# Patient Record
Sex: MALE | Race: WHITE | NOT HISPANIC OR LATINO | Employment: FULL TIME | URBAN - METROPOLITAN AREA
[De-identification: names, ages, dates, MRNs, and addresses within clinical notes are randomized per-mention and may not be internally consistent; named-entity substitution may affect disease eponyms.]

---

## 2018-01-01 ENCOUNTER — OFFICE VISIT (OUTPATIENT)
Dept: HEPATOLOGY | Facility: CLINIC | Age: 58
End: 2018-01-01
Payer: COMMERCIAL

## 2018-01-01 ENCOUNTER — LAB VISIT (OUTPATIENT)
Dept: LAB | Facility: HOSPITAL | Age: 58
End: 2018-01-01
Attending: INTERNAL MEDICINE
Payer: COMMERCIAL

## 2018-01-01 VITALS
HEART RATE: 99 BPM | DIASTOLIC BLOOD PRESSURE: 79 MMHG | OXYGEN SATURATION: 93 % | BODY MASS INDEX: 28.53 KG/M2 | SYSTOLIC BLOOD PRESSURE: 169 MMHG | TEMPERATURE: 98 F | WEIGHT: 199.31 LBS | HEIGHT: 70 IN

## 2018-01-01 DIAGNOSIS — B19.20 DECOMPENSATED HCV CIRRHOSIS: ICD-10-CM

## 2018-01-01 DIAGNOSIS — K74.69 DECOMPENSATED CIRRHOSIS RELATED TO HEPATITIS C VIRUS (HCV): ICD-10-CM

## 2018-01-01 DIAGNOSIS — B19.20 DECOMPENSATED HCV CIRRHOSIS: Primary | ICD-10-CM

## 2018-01-01 DIAGNOSIS — D59.10 AUTOIMMUNE HEMOLYTIC ANEMIA: ICD-10-CM

## 2018-01-01 DIAGNOSIS — K74.69 DECOMPENSATED HCV CIRRHOSIS: Primary | ICD-10-CM

## 2018-01-01 DIAGNOSIS — D49.0 LIVER NEOPLASM: ICD-10-CM

## 2018-01-01 DIAGNOSIS — R18.8 OTHER ASCITES: Primary | ICD-10-CM

## 2018-01-01 DIAGNOSIS — R18.8 CIRRHOSIS OF LIVER WITH ASCITES, UNSPECIFIED HEPATIC CIRRHOSIS TYPE: ICD-10-CM

## 2018-01-01 DIAGNOSIS — K76.6 HYPERTENSION, PORTAL: ICD-10-CM

## 2018-01-01 DIAGNOSIS — B19.20 DECOMPENSATED CIRRHOSIS RELATED TO HEPATITIS C VIRUS (HCV): ICD-10-CM

## 2018-01-01 DIAGNOSIS — K74.69 DECOMPENSATED HCV CIRRHOSIS: ICD-10-CM

## 2018-01-01 DIAGNOSIS — C34.02 MALIGNANT NEOPLASM OF HILUS OF LEFT LUNG: ICD-10-CM

## 2018-01-01 DIAGNOSIS — K74.60 CIRRHOSIS OF LIVER WITH ASCITES, UNSPECIFIED HEPATIC CIRRHOSIS TYPE: ICD-10-CM

## 2018-01-01 LAB
ALBUMIN SERPL BCP-MCNC: 3.1 G/DL
ALP SERPL-CCNC: 106 U/L
ALT SERPL W/O P-5'-P-CCNC: 24 U/L
ANION GAP SERPL CALC-SCNC: 14 MMOL/L
ANISOCYTOSIS BLD QL SMEAR: ABNORMAL
AST SERPL-CCNC: 23 U/L
BASOPHILS # BLD AUTO: 0.04 K/UL
BASOPHILS NFR BLD: 0.2 %
BILIRUB DIRECT SERPL-MCNC: 2.2 MG/DL
BILIRUB SERPL-MCNC: 11.1 MG/DL
BUN SERPL-MCNC: 23 MG/DL
CALCIUM SERPL-MCNC: 8.9 MG/DL
CHLORIDE SERPL-SCNC: 103 MMOL/L
CO2 SERPL-SCNC: 21 MMOL/L
CREAT SERPL-MCNC: 0.8 MG/DL
DIFFERENTIAL METHOD: ABNORMAL
EOSINOPHIL # BLD AUTO: 0.2 K/UL
EOSINOPHIL NFR BLD: 1.1 %
ERYTHROCYTE [DISTWIDTH] IN BLOOD BY AUTOMATED COUNT: 21.4 %
EST. GFR  (AFRICAN AMERICAN): >60 ML/MIN/1.73 M^2
EST. GFR  (NON AFRICAN AMERICAN): >60 ML/MIN/1.73 M^2
GGT SERPL-CCNC: 98 U/L
GLUCOSE SERPL-MCNC: 441 MG/DL
HCT VFR BLD AUTO: 25.8 %
HCV GENTYP SERPL NAA+PROBE: ABNORMAL
HCV RNA SERPL NAA+PROBE-LOG IU: 4.68 LOG (10) IU/ML
HCV RNA SERPL QL NAA+PROBE: DETECTED
HCV RNA SPEC NAA+PROBE-ACNC: ABNORMAL IU/ML
HGB BLD-MCNC: 8.3 G/DL
IMM GRANULOCYTES # BLD AUTO: 0.27 K/UL
IMM GRANULOCYTES NFR BLD AUTO: 1.5 %
INR PPP: 1.3
LYMPHOCYTES # BLD AUTO: 1 K/UL
LYMPHOCYTES NFR BLD: 5.7 %
MCH RBC QN AUTO: 40.7 PG
MCHC RBC AUTO-ENTMCNC: 32.2 G/DL
MCV RBC AUTO: 127 FL
MONOCYTES # BLD AUTO: 1 K/UL
MONOCYTES NFR BLD: 5.9 %
NEUTROPHILS # BLD AUTO: 14.9 K/UL
NEUTROPHILS NFR BLD: 85.6 %
NRBC BLD-RTO: 2 /100 WBC
PLATELET # BLD AUTO: 65 K/UL
PLATELET BLD QL SMEAR: ABNORMAL
PMV BLD AUTO: 11.3 FL
POLYCHROMASIA BLD QL SMEAR: ABNORMAL
POTASSIUM SERPL-SCNC: 3.4 MMOL/L
PROT SERPL-MCNC: 5.5 G/DL
PROTHROMBIN TIME: 13 SEC
RBC # BLD AUTO: 2.04 M/UL
SODIUM SERPL-SCNC: 138 MMOL/L
WBC # BLD AUTO: 17.42 K/UL

## 2018-01-01 PROCEDURE — 99999 PR PBB SHADOW E&M-EST. PATIENT-LVL III: CPT | Mod: PBBFAC,,, | Performed by: INTERNAL MEDICINE

## 2018-01-01 PROCEDURE — 36415 COLL VENOUS BLD VENIPUNCTURE: CPT

## 2018-01-01 PROCEDURE — 87522 HEPATITIS C REVRS TRNSCRPJ: CPT

## 2018-01-01 PROCEDURE — 85610 PROTHROMBIN TIME: CPT

## 2018-01-01 PROCEDURE — 85025 COMPLETE CBC W/AUTO DIFF WBC: CPT

## 2018-01-01 PROCEDURE — 99205 OFFICE O/P NEW HI 60 MIN: CPT | Mod: S$GLB,,, | Performed by: INTERNAL MEDICINE

## 2018-01-01 PROCEDURE — 3077F SYST BP >= 140 MM HG: CPT | Mod: CPTII,S$GLB,, | Performed by: INTERNAL MEDICINE

## 2018-01-01 PROCEDURE — 3008F BODY MASS INDEX DOCD: CPT | Mod: CPTII,S$GLB,, | Performed by: INTERNAL MEDICINE

## 2018-01-01 PROCEDURE — 82977 ASSAY OF GGT: CPT

## 2018-01-01 PROCEDURE — 80053 COMPREHEN METABOLIC PANEL: CPT

## 2018-01-01 PROCEDURE — 82248 BILIRUBIN DIRECT: CPT

## 2018-01-01 PROCEDURE — 87902 NFCT AGT GNTYP ALYS HEP C: CPT

## 2018-01-01 PROCEDURE — 3078F DIAST BP <80 MM HG: CPT | Mod: CPTII,S$GLB,, | Performed by: INTERNAL MEDICINE

## 2018-01-01 RX ORDER — SPIRONOLACTONE 50 MG/1
50 TABLET, FILM COATED ORAL DAILY
Qty: 30 TABLET | Refills: 3 | Status: SHIPPED | OUTPATIENT
Start: 2018-01-01 | End: 2018-09-15

## 2018-01-01 RX ORDER — FUROSEMIDE 20 MG/1
40 TABLET ORAL DAILY
Qty: 30 TABLET | Refills: 3 | Status: SHIPPED | OUTPATIENT
Start: 2018-01-01 | End: 2019-05-18

## 2018-03-17 PROBLEM — D58.9 HEMOLYTIC ANEMIA: Status: ACTIVE | Noted: 2018-01-01

## 2018-03-17 PROBLEM — K74.60 LIVER CIRRHOSIS: Status: ACTIVE | Noted: 2018-01-01

## 2018-03-17 PROBLEM — R63.4 WEIGHT LOSS, UNINTENTIONAL: Status: ACTIVE | Noted: 2018-01-01

## 2018-03-17 PROBLEM — D69.6 THROMBOCYTOPENIA: Status: ACTIVE | Noted: 2018-01-01

## 2018-03-18 PROBLEM — D64.89 ANEMIA DUE TO MULTIPLE MECHANISMS: Status: ACTIVE | Noted: 2018-01-01

## 2018-03-18 PROBLEM — D59.10 AUTOIMMUNE HEMOLYTIC ANEMIA: Status: ACTIVE | Noted: 2018-01-01

## 2018-03-18 PROBLEM — D73.1 THROMBOCYTOPENIA DUE TO HYPERSPLENISM: Status: ACTIVE | Noted: 2018-01-01

## 2018-03-18 PROBLEM — D69.59 THROMBOCYTOPENIA DUE TO HYPERSPLENISM: Status: ACTIVE | Noted: 2018-01-01

## 2018-03-18 PROBLEM — B19.20 COMPENSATED HCV CIRRHOSIS: Status: ACTIVE | Noted: 2018-01-01

## 2018-03-18 PROBLEM — K74.69 COMPENSATED HCV CIRRHOSIS: Status: ACTIVE | Noted: 2018-01-01

## 2018-03-19 PROBLEM — K74.60 CHRONIC HEPATITIS C WITH CIRRHOSIS: Status: ACTIVE | Noted: 2018-01-01

## 2018-03-19 PROBLEM — B18.2 CHRONIC HEPATITIS C WITH CIRRHOSIS: Status: ACTIVE | Noted: 2018-01-01

## 2018-03-19 PROBLEM — R06.02 SOB (SHORTNESS OF BREATH): Status: ACTIVE | Noted: 2018-01-01

## 2018-03-19 PROBLEM — C34.02 MALIGNANT NEOPLASM OF HILUS OF LEFT LUNG: Status: ACTIVE | Noted: 2018-01-01

## 2018-04-23 PROBLEM — K21.9 GERD (GASTROESOPHAGEAL REFLUX DISEASE): Status: ACTIVE | Noted: 2018-01-01

## 2018-04-23 PROBLEM — J44.9 COPD (CHRONIC OBSTRUCTIVE PULMONARY DISEASE): Status: ACTIVE | Noted: 2018-01-01

## 2018-04-23 PROBLEM — I10 HYPERTENSION: Status: ACTIVE | Noted: 2018-01-01

## 2018-04-23 PROBLEM — F14.11 H/O COCAINE ABUSE: Status: ACTIVE | Noted: 2018-01-01

## 2018-05-18 NOTE — PROGRESS NOTES
Subjective:       Patient ID: Miguel Olguin is a 57 y.o. male.    Chief Complaint: Hepatitis C and Cirrhosis    HPI  I saw this 57 y.o. man who came to clinic with a friend. He is currently being treated for lung cancer but discovered about 1 month ago (April 2018) that he had decompensated HCV cirrhosis.    Treatment naive.    Lung Ca- 1 year ago- radiotherapy (May 2017- 8 treatments)  Now followed by Dr Zazueta at [facility] until lung Ca  On prednisone since April 23 2018 for autoimmune anemia.  Recently started on diuretics    Main complaint is ascites/leg edema and jaundice that have been troubling him for the last 2 weeks    On methadone- 19 years    No alcohol since age 32.    MELD-Na score: 18 at 5/18/2018  1:43 PM  MELD score: 18 at 5/18/2018  1:43 PM  Calculated from:  Serum Creatinine: 0.8 mg/dL (Rounded to 1) at 5/18/2018  1:43 PM  Serum Sodium: 138 mmol/L (Rounded to 137) at 5/18/2018  1:43 PM  Total Bilirubin: 11.1 mg/dL at 5/18/2018  1:43 PM  INR(ratio): 1.3 at 5/18/2018  1:43 PM  Age: 57 years      PMH:  Type DM  Lung Ca  HCV cirrhosis  Autoimmune hemolytic anemia    SH:  Lives with friends  No family around  Ex smoker    Review of Systems   Constitutional: Negative for activity change, appetite change, chills, fatigue, fever and unexpected weight change.   HENT: Negative for hearing loss.    Eyes: Negative for discharge and visual disturbance.   Respiratory: Negative for cough, chest tightness, shortness of breath and wheezing.    Cardiovascular: Negative for chest pain, palpitations and leg swelling.   Gastrointestinal: Negative for abdominal distention, abdominal pain, constipation, diarrhea and nausea.   Genitourinary: Negative for dysuria and frequency.   Musculoskeletal: Negative for arthralgias and back pain.   Skin: Negative for pallor and rash.   Neurological: Negative for dizziness, tremors, speech difficulty and headaches.   Hematological: Negative for adenopathy.  "  Psychiatric/Behavioral: Negative for agitation and confusion.           Lab Results   Component Value Date    ALT 24 05/18/2018    AST 23 05/18/2018    GGT 98 (H) 05/18/2018    ALKPHOS 106 05/18/2018    BILITOT 11.1 (H) 05/18/2018     Past Medical History:   Diagnosis Date    Anemia due to multiple mechanisms 3/18/2018    Autoimmune hemolytic anemia 3/18/2018    Calculus of kidney     Cancer     LUNG    Cocaine abuse     Compensated HCV cirrhosis 3/18/2018    COPD (chronic obstructive pulmonary disease)     Diabetes mellitus     Gallstones     GERD (gastroesophageal reflux disease)     Hepatitis C     Hypertension     Thrombocytopenia due to hypersplenism 3/18/2018     Past Surgical History:   Procedure Laterality Date    APPENDECTOMY      HERNIA REPAIR      KNEE SURGERY       Current Outpatient Prescriptions   Medication Sig    folic acid (FOLVITE) 1 MG tablet Take 1 tablet (1 mg total) by mouth once daily.    furosemide (LASIX) 20 MG tablet Take 2 tablets (40 mg total) by mouth once daily.    insulin detemir U-100 (LEVEMIR) 100 unit/mL injection Inject 15 Units into the skin 2 (two) times daily. (Patient taking differently: Inject 16 Units into the skin once daily. )    insulin lispro (HUMALOG KWIKPEN INSULIN) 100 unit/mL InPn pen Glucose Pre-meal: If 151-200 give 2 units; if 201-250 give 3 units; if 251-300 give 4 units; if 301-350 give 5 units; if >350 give 6 units    methadone (METHADOSE) 40 mg disintegrating tablet Take 240 mg by mouth once daily.    nystatin (MYCOSTATIN) 100,000 unit/mL suspension Take 5 mLs by mouth 2 (two) times daily.    pantoprazole (PROTONIX) 40 MG tablet Take 1 tablet (40 mg total) by mouth once daily.    pen needle, diabetic 31 gauge x 5/16" Ndle 1 applicator by Misc.(Non-Drug; Combo Route) route 4 (four) times daily.    predniSONE (DELTASONE) 20 MG tablet Take 20 mg by mouth 4 (four) times daily.    spironolactone (ALDACTONE) 50 MG tablet Take 1 tablet (50 " mg total) by mouth once daily.     Current Facility-Administered Medications   Medication    famotidine (PF) injection 20 mg    famotidine (PF) injection 20 mg       Objective:      Physical Exam   Constitutional: He is oriented to person, place, and time. He appears well-developed and well-nourished.   HENT:   Head: Normocephalic.   Eyes: Pupils are equal, round, and reactive to light. Scleral icterus is present.   Neck: No thyromegaly present.   Cardiovascular: Normal rate, regular rhythm and normal heart sounds.    Pulmonary/Chest: Effort normal and breath sounds normal. He has no wheezes.   Abdominal: Soft. He exhibits distension. He exhibits no mass. There is no tenderness.   Musculoskeletal: He exhibits edema.   Lymphadenopathy:     He has no cervical adenopathy.   Neurological: He is alert and oriented to person, place, and time.   Skin: Skin is warm. No rash noted. No erythema.   Psychiatric: He has a normal mood and affect. His behavior is normal.       Assessment:       1. Other ascites    2. Liver neoplasm    3. Hypertension, portal    4. Malignant neoplasm of hilus of left lung    5. Cirrhosis of liver with ascites, unspecified hepatic cirrhosis type    6. Decompensated cirrhosis related to hepatitis C virus (HCV)    7. Autoimmune hemolytic anemia        Plan:   Unfortunately he has advanced HCV cirrhosis and I do not think that antiviral treatment at this stage will help him. His lung cancer means that he I snot a liver transplant candidate.    We are therefore limited in his management which can only be supportive.    - continue diuretics but increase doses- new prescription sent  - EGD to check for varices and assess his dysphagia  - CT abdo to rule out HCC as cause of his recent decompensation  - weekly labs  Clinic in 2 weeks

## 2018-05-18 NOTE — LETTER
May 20, 2018      Vicente Rivas MD  [address] [address]           [facility] - Hepatology  [address]  [facility] [address]  Phone: [phone]  Fax: [phone]          Patient: Miguel Olguin   MR Number: [id number]   YOB: 1960   Date of Visit: 5/18/2018       Dear Dr. Vicente Rivas:    Thank you for referring Miguel Olguin to me for evaluation. Attached you will find relevant portions of my assessment and plan of care.    If you have questions, please do not hesitate to call me. I look forward to following Miguel Olguin along with you.    Sincerely,    Miguel Burns MD    Enclosure  CC:  No Recipients    If you would like to receive this communication electronically, please contact [email or web address] or [phone] to request more information on [employer] access.    For providers and/or their staff who would like to refer a patient to [facility], please contact us through our one-stop-shop provider referral line, [facility] , at [phone].    If you feel you have received this communication in error or would no longer like to receive these types of communications, please e-mail [email or web address]

## 2018-05-20 PROBLEM — K74.60 LIVER CIRRHOSIS: Status: RESOLVED | Noted: 2018-01-01 | Resolved: 2018-01-01

## 2018-05-29 PROBLEM — R60.1 ANASARCA: Status: ACTIVE | Noted: 2018-01-01

## 2018-05-30 PROBLEM — L03.115 CELLULITIS OF RIGHT LOWER EXTREMITY: Status: ACTIVE | Noted: 2018-01-01

## 2018-05-30 PROBLEM — R23.8 BLISTERS OF MULTIPLE SITES: Status: ACTIVE | Noted: 2018-01-01

## 2022-07-09 ENCOUNTER — TELEPHONE ENCOUNTER (OUTPATIENT)
Dept: URBAN - METROPOLITAN AREA CLINIC 121 | Facility: CLINIC | Age: 62
End: 2022-07-09

## 2022-07-10 ENCOUNTER — TELEPHONE ENCOUNTER (OUTPATIENT)
Dept: URBAN - METROPOLITAN AREA CLINIC 121 | Facility: CLINIC | Age: 62
End: 2022-07-10

## 2022-07-30 ENCOUNTER — TELEPHONE ENCOUNTER (OUTPATIENT)
Age: 62
End: 2022-07-30

## 2022-07-30 RX ORDER — LACTULOSE 10 G/15ML
30 CC SOLUTION ORAL
Qty: 0 | Refills: 16 | OUTPATIENT
Start: 2011-12-14 | End: 2012-08-03

## 2022-07-30 RX ORDER — OMEPRAZOLE 40 MG/1
1 (ONE) CAPSULE, DELAYED RELEASE ORAL DAILY
Qty: 0 | Refills: 16 | OUTPATIENT
Start: 2011-12-16 | End: 2012-08-03

## 2022-07-31 ENCOUNTER — TELEPHONE ENCOUNTER (OUTPATIENT)
Age: 62
End: 2022-07-31

## 2022-07-31 RX ORDER — LACTULOSE 10 G/15ML
30 CC SOLUTION ORAL
Qty: 0 | Refills: 16 | Status: ACTIVE | COMMUNITY
Start: 2011-12-14

## 2022-07-31 RX ORDER — OMEPRAZOLE 40 MG/1
1 (ONE) CAPSULE, DELAYED RELEASE ORAL DAILY
Qty: 0 | Refills: 16 | Status: ACTIVE | COMMUNITY
Start: 2011-12-16